# Patient Record
Sex: MALE | Race: OTHER | HISPANIC OR LATINO | Employment: UNEMPLOYED | URBAN - METROPOLITAN AREA
[De-identification: names, ages, dates, MRNs, and addresses within clinical notes are randomized per-mention and may not be internally consistent; named-entity substitution may affect disease eponyms.]

---

## 2017-02-24 ENCOUNTER — HOSPITAL ENCOUNTER (EMERGENCY)
Facility: HOSPITAL | Age: 62
Discharge: HOME | End: 2017-02-24
Admitting: EMERGENCY MEDICINE
Payer: COMMERCIAL

## 2017-02-24 VITALS
SYSTOLIC BLOOD PRESSURE: 137 MMHG | HEART RATE: 61 BPM | RESPIRATION RATE: 18 BRPM | OXYGEN SATURATION: 97 % | DIASTOLIC BLOOD PRESSURE: 84 MMHG | WEIGHT: 170.42 LBS | TEMPERATURE: 98 F

## 2017-02-24 DIAGNOSIS — M79.671 BILATERAL FOOT PAIN: Primary | ICD-10-CM

## 2017-02-24 DIAGNOSIS — M79.672 BILATERAL FOOT PAIN: Primary | ICD-10-CM

## 2017-02-24 PROCEDURE — 99283 EMERGENCY DEPT VISIT LOW MDM: CPT

## 2017-02-24 RX ORDER — GABAPENTIN 300 MG/1
300 CAPSULE ORAL 3 TIMES DAILY
Qty: 42 CAPSULE | Refills: 0 | Status: SHIPPED | OUTPATIENT
Start: 2017-02-24 | End: 2017-12-28 | Stop reason: ALTCHOICE

## 2017-02-24 RX ORDER — ATORVASTATIN CALCIUM 40 MG/1
TABLET, FILM COATED ORAL
COMMUNITY
Start: 2016-01-08 | End: 2017-12-28 | Stop reason: ALTCHOICE

## 2017-05-23 ENCOUNTER — ALLSCRIPTS OFFICE VISIT (OUTPATIENT)
Dept: OTHER | Facility: OTHER | Age: 62
End: 2017-05-23

## 2017-06-12 ENCOUNTER — OFFICE VISIT (OUTPATIENT)
Dept: URGENT CARE | Facility: MEDICAL CENTER | Age: 62
End: 2017-06-12
Payer: COMMERCIAL

## 2017-06-12 PROCEDURE — G0382 LEV 3 HOSP TYPE B ED VISIT: HCPCS

## 2017-06-12 PROCEDURE — 99283 EMERGENCY DEPT VISIT LOW MDM: CPT

## 2017-06-14 ENCOUNTER — HOSPITAL ENCOUNTER (EMERGENCY)
Facility: HOSPITAL | Age: 62
Discharge: HOME | End: 2017-06-14
Payer: COMMERCIAL

## 2017-06-14 VITALS
DIASTOLIC BLOOD PRESSURE: 81 MMHG | TEMPERATURE: 98.7 F | SYSTOLIC BLOOD PRESSURE: 121 MMHG | WEIGHT: 157 LBS | RESPIRATION RATE: 18 BRPM | OXYGEN SATURATION: 99 % | HEART RATE: 82 BPM

## 2017-06-14 DIAGNOSIS — L02.212 ABSCESS OF BACK: Primary | ICD-10-CM

## 2017-06-14 PROCEDURE — 99282 EMERGENCY DEPT VISIT SF MDM: CPT

## 2017-06-14 RX ORDER — CLINDAMYCIN HYDROCHLORIDE 300 MG/1
300 CAPSULE ORAL 3 TIMES DAILY
Qty: 30 CAPSULE | Refills: 0 | Status: SHIPPED | OUTPATIENT
Start: 2017-06-14 | End: 2017-06-24

## 2017-12-28 ENCOUNTER — APPOINTMENT (EMERGENCY)
Dept: CT IMAGING | Facility: HOSPITAL | Age: 62
End: 2017-12-28
Payer: COMMERCIAL

## 2017-12-28 ENCOUNTER — HOSPITAL ENCOUNTER (EMERGENCY)
Facility: HOSPITAL | Age: 62
Discharge: HOME | End: 2017-12-28
Attending: EMERGENCY MEDICINE | Admitting: EMERGENCY MEDICINE
Payer: COMMERCIAL

## 2017-12-28 VITALS
SYSTOLIC BLOOD PRESSURE: 200 MMHG | HEART RATE: 78 BPM | TEMPERATURE: 98.4 F | RESPIRATION RATE: 14 BRPM | WEIGHT: 145.5 LBS | DIASTOLIC BLOOD PRESSURE: 106 MMHG | OXYGEN SATURATION: 99 %

## 2017-12-28 DIAGNOSIS — A08.4 VIRAL GASTROENTERITIS: Primary | ICD-10-CM

## 2017-12-28 LAB
ALBUMIN SERPL BCP-MCNC: 3.3 G/DL (ref 3.5–5)
ALP SERPL-CCNC: 76 U/L (ref 46–116)
ALT SERPL W P-5'-P-CCNC: 22 U/L (ref 12–78)
ANION GAP SERPL CALCULATED.3IONS-SCNC: 7 MMOL/L (ref 4–13)
ANISOCYTOSIS BLD QL SMEAR: PRESENT
AST SERPL W P-5'-P-CCNC: 25 U/L (ref 5–45)
ATRIAL RATE: 76 BPM
BACTERIA UR QL AUTO: ABNORMAL /HPF
BASOPHILS # BLD MANUAL: 0 THOUSAND/UL (ref 0–0.1)
BASOPHILS NFR MAR MANUAL: 0 % (ref 0–1)
BILIRUB SERPL-MCNC: 0.57 MG/DL (ref 0.2–1)
BILIRUB UR QL STRIP: NEGATIVE
BUN SERPL-MCNC: 15 MG/DL (ref 5–25)
CALCIUM SERPL-MCNC: 9.5 MG/DL (ref 8.3–10.1)
CHLORIDE SERPL-SCNC: 100 MMOL/L (ref 100–108)
CLARITY UR: CLEAR
CO2 SERPL-SCNC: 30 MMOL/L (ref 21–32)
COLOR UR: YELLOW
CREAT SERPL-MCNC: 0.94 MG/DL (ref 0.6–1.3)
EOSINOPHIL # BLD MANUAL: 0.2 THOUSAND/UL (ref 0–0.4)
EOSINOPHIL NFR BLD MANUAL: 2 % (ref 0–6)
ERYTHROCYTE [DISTWIDTH] IN BLOOD BY AUTOMATED COUNT: 15.8 % (ref 11.6–15.1)
GFR SERPL CREATININE-BSD FRML MDRD: 85 ML/MIN/1.73SQ M
GLUCOSE SERPL-MCNC: 115 MG/DL (ref 65–140)
GLUCOSE UR STRIP-MCNC: NEGATIVE MG/DL
HCT VFR BLD AUTO: 44.4 % (ref 36.5–49.3)
HGB BLD-MCNC: 14.5 G/DL (ref 12–17)
HGB UR QL STRIP.AUTO: ABNORMAL
KETONES UR STRIP-MCNC: NEGATIVE MG/DL
LACTATE SERPL-SCNC: 0.9 MMOL/L (ref 0.5–2)
LEUKOCYTE ESTERASE UR QL STRIP: NEGATIVE
LIPASE SERPL-CCNC: 103 U/L (ref 73–393)
LYMPHOCYTES # BLD AUTO: 0.8 THOUSAND/UL (ref 0.6–4.47)
LYMPHOCYTES # BLD AUTO: 8 % (ref 14–44)
MCH RBC QN AUTO: 31 PG (ref 26.8–34.3)
MCHC RBC AUTO-ENTMCNC: 32.7 G/DL (ref 31.4–37.4)
MCV RBC AUTO: 95 FL (ref 82–98)
MONOCYTES # BLD AUTO: 0.3 THOUSAND/UL (ref 0–1.22)
MONOCYTES NFR BLD: 3 % (ref 4–12)
NEUTROPHILS # BLD MANUAL: 8.65 THOUSAND/UL (ref 1.85–7.62)
NEUTS SEG NFR BLD AUTO: 87 % (ref 43–75)
NITRITE UR QL STRIP: NEGATIVE
NON-SQ EPI CELLS URNS QL MICRO: ABNORMAL /HPF
NRBC BLD AUTO-RTO: 0 /100 WBCS
P AXIS: -10 DEGREES
PH UR STRIP.AUTO: 8.5 [PH] (ref 4.5–8)
PLATELET # BLD AUTO: 252 THOUSANDS/UL (ref 149–390)
PLATELET BLD QL SMEAR: ADEQUATE
PMV BLD AUTO: 9.7 FL (ref 8.9–12.7)
POTASSIUM SERPL-SCNC: 4.1 MMOL/L (ref 3.5–5.3)
PR INTERVAL: 152 MS
PROT SERPL-MCNC: 8.6 G/DL (ref 6.4–8.2)
PROT UR STRIP-MCNC: NEGATIVE MG/DL
QRS AXIS: -14 DEGREES
QRSD INTERVAL: 98 MS
QT INTERVAL: 384 MS
QTC INTERVAL: 432 MS
RBC # BLD AUTO: 4.68 MILLION/UL (ref 3.88–5.62)
RBC #/AREA URNS AUTO: ABNORMAL /HPF
SODIUM SERPL-SCNC: 137 MMOL/L (ref 136–145)
SP GR UR STRIP.AUTO: 1.02 (ref 1–1.03)
SPECIMEN SOURCE: NORMAL
T WAVE AXIS: 4 DEGREES
TOTAL CELLS COUNTED SPEC: 100
TROPONIN I BLD-MCNC: 0 NG/ML (ref 0–0.08)
UROBILINOGEN UR QL STRIP.AUTO: 0.2 E.U./DL
VENTRICULAR RATE: 76 BPM
WBC # BLD AUTO: 9.94 THOUSAND/UL (ref 4.31–10.16)
WBC #/AREA URNS AUTO: ABNORMAL /HPF

## 2017-12-28 PROCEDURE — 93005 ELECTROCARDIOGRAM TRACING: CPT

## 2017-12-28 PROCEDURE — 96374 THER/PROPH/DIAG INJ IV PUSH: CPT

## 2017-12-28 PROCEDURE — 99284 EMERGENCY DEPT VISIT MOD MDM: CPT

## 2017-12-28 PROCEDURE — 85007 BL SMEAR W/DIFF WBC COUNT: CPT | Performed by: EMERGENCY MEDICINE

## 2017-12-28 PROCEDURE — 85027 COMPLETE CBC AUTOMATED: CPT | Performed by: EMERGENCY MEDICINE

## 2017-12-28 PROCEDURE — 96361 HYDRATE IV INFUSION ADD-ON: CPT

## 2017-12-28 PROCEDURE — 83690 ASSAY OF LIPASE: CPT | Performed by: EMERGENCY MEDICINE

## 2017-12-28 PROCEDURE — 80053 COMPREHEN METABOLIC PANEL: CPT | Performed by: EMERGENCY MEDICINE

## 2017-12-28 PROCEDURE — 36415 COLL VENOUS BLD VENIPUNCTURE: CPT | Performed by: EMERGENCY MEDICINE

## 2017-12-28 PROCEDURE — 83605 ASSAY OF LACTIC ACID: CPT | Performed by: EMERGENCY MEDICINE

## 2017-12-28 PROCEDURE — 74177 CT ABD & PELVIS W/CONTRAST: CPT

## 2017-12-28 PROCEDURE — 84484 ASSAY OF TROPONIN QUANT: CPT

## 2017-12-28 PROCEDURE — 96375 TX/PRO/DX INJ NEW DRUG ADDON: CPT

## 2017-12-28 PROCEDURE — 81002 URINALYSIS NONAUTO W/O SCOPE: CPT | Performed by: EMERGENCY MEDICINE

## 2017-12-28 PROCEDURE — 81001 URINALYSIS AUTO W/SCOPE: CPT

## 2017-12-28 PROCEDURE — 93005 ELECTROCARDIOGRAM TRACING: CPT | Performed by: EMERGENCY MEDICINE

## 2017-12-28 RX ORDER — ONDANSETRON 2 MG/ML
4 INJECTION INTRAMUSCULAR; INTRAVENOUS ONCE
Status: COMPLETED | OUTPATIENT
Start: 2017-12-28 | End: 2017-12-28

## 2017-12-28 RX ORDER — ONDANSETRON 4 MG/1
4 TABLET, FILM COATED ORAL EVERY 6 HOURS
Qty: 12 TABLET | Refills: 0 | Status: SHIPPED | OUTPATIENT
Start: 2017-12-28 | End: 2018-05-17

## 2017-12-28 RX ORDER — MORPHINE SULFATE 2 MG/ML
2 INJECTION, SOLUTION INTRAMUSCULAR; INTRAVENOUS ONCE
Status: COMPLETED | OUTPATIENT
Start: 2017-12-28 | End: 2017-12-28

## 2017-12-28 RX ADMIN — SODIUM CHLORIDE 1000 ML: 0.9 INJECTION, SOLUTION INTRAVENOUS at 08:11

## 2017-12-28 RX ADMIN — IOHEXOL 100 ML: 350 INJECTION, SOLUTION INTRAVENOUS at 09:31

## 2017-12-28 RX ADMIN — ONDANSETRON 4 MG: 2 INJECTION INTRAMUSCULAR; INTRAVENOUS at 08:13

## 2017-12-28 RX ADMIN — MORPHINE SULFATE 2 MG: 2 INJECTION, SOLUTION INTRAMUSCULAR; INTRAVENOUS at 08:14

## 2017-12-28 NOTE — ED PROVIDER NOTES
History  Chief Complaint   Patient presents with    Abdominal Pain     vomiting,  bilateral knee pain, Denies injury  Started last night  C/o abd  Pain and vomiting since last night  No fevers  No previous abd  Surgery  Last bm today and it was normal     Pt  Complained to the triage nurse about b/l knee pain when walking, no injury  He says they don't hurt now when he is lying down  Prior to Admission Medications   Prescriptions Last Dose Informant Patient Reported? Taking?   lisinopril (ZESTRIL) 20 mg tablet Past Month at Unknown time  Yes Yes   Sig: Take 20 mg by mouth daily      Facility-Administered Medications: None       Past Medical History:   Diagnosis Date    Hyperlipidemia     Hypertension     Peripheral arterial disease ([facility] [address] )        Past Surgical History:   Procedure Laterality Date    HERNIA REPAIR      R lower quad    SHOULDER SURGERY         History reviewed  No pertinent family history  I have reviewed and agree with the history as documented  Social History   Substance Use Topics    Smoking status: Current Every Day Smoker    Smokeless tobacco: Never Used    Alcohol use No        Review of Systems   Constitutional: Positive for appetite change  Negative for fatigue and fever  HENT: Negative for rhinorrhea and sore throat  Respiratory: Negative for cough, shortness of breath and wheezing  Cardiovascular: Negative for chest pain and leg swelling  Gastrointestinal: Positive for abdominal pain, nausea and vomiting  Negative for diarrhea  Genitourinary: Negative for dysuria and flank pain  Musculoskeletal: Negative for back pain and neck pain  Skin: Negative for rash  Neurological: Negative for syncope and headaches     Psychiatric/Behavioral:        Mood normal       Physical Exam  ED Triage Vitals [12/28/17 0732]   Temperature Pulse Respirations Blood Pressure SpO2   98 4 °F (36 9 °C) 80 16 (!) 168/109 98 %      Temp Source Heart Rate Source Patient Position - Orthostatic VS BP Location FiO2 (%)   Oral Monitor Sitting Right arm --      Pain Score       5           Orthostatic Vital Signs  Vitals:    12/28/17 0930 12/28/17 0937 12/28/17 1030 12/28/17 1100   BP:  (!) 200/106     Pulse: 80 80 80 78   Patient Position - Orthostatic VS:  Lying         Physical Exam   Constitutional: He is oriented to person, place, and time  He appears well-developed and well-nourished  HENT:   Head: Normocephalic and atraumatic  Neck: Normal range of motion  Neck supple  Cardiovascular: Normal rate and regular rhythm  Pulmonary/Chest: Effort normal and breath sounds normal    Abdominal: Soft  There is no tenderness  Musculoskeletal: Normal range of motion  Mild b/l knee tenderness, +n/v intact   Neurological: He is alert and oriented to person, place, and time  Skin: Skin is warm and dry  Nursing note and vitals reviewed        ED Medications  Medications   sodium chloride 0 9 % bolus 1,000 mL (0 mL Intravenous Stopped 12/28/17 0926)   morphine injection 2 mg (2 mg Intravenous Given 12/28/17 0814)   ondansetron (ZOFRAN) injection 4 mg (4 mg Intravenous Given 12/28/17 0813)   iohexol (OMNIPAQUE) 350 MG/ML injection (MULTI-DOSE) 100 mL (100 mL Intravenous Given 12/28/17 0931)       Diagnostic Studies  Results Reviewed     Procedure Component Value Units Date/Time    Urine Microscopic [[id number]]  (Abnormal) Collected:  12/28/17 0957    Lab Status:  Final result Specimen:  Urine from Urine, Clean Catch Updated:  12/28/17 0956     RBC, UA 1-2 (A) /hpf      WBC, UA None Seen /hpf      Epithelial Cells None Seen /hpf      Bacteria, UA None Seen /hpf     POCT urinalysis dipstick [[id number]]  (Abnormal) Resulted:  12/28/17 0941    Lab Status:  Final result Specimen:  Urine Updated:  12/28/17 0941    ED Urine Macroscopic [[id number]]  (Abnormal) Collected:  12/28/17 0957    Lab Status:  Final result Specimen:  Urine Updated:  12/28/17 0939     Color, UA Yellow     Clarity, UA Clear     pH, UA 8 5 (H)     Leukocytes, UA Negative     Nitrite, UA Negative     Protein, UA Negative mg/dl      Glucose, UA Negative mg/dl      Ketones, UA Negative mg/dl      Urobilinogen, UA 0 2 E U /dl      Bilirubin, UA Negative     Blood, UA Trace (A)     Specific [place], UA 1 020    Narrative:       CLINITEK RESULT    CBC and differential [[id number]]  (Abnormal) Collected:  12/28/17 0815    Lab Status:  Final result Specimen:  Blood from Arm, Left Updated:  12/28/17 0858     WBC 9 94 Thousand/uL      RBC 4 68 Million/uL      Hemoglobin 14 5 g/dL      Hematocrit 44 4 %      MCV 95 fL      MCH 31 0 pg      MCHC 32 7 g/dL      RDW 15 8 (H) %      MPV 9 7 fL      Platelets 218 Thousands/uL      nRBC 0 /100 WBCs     Narrative: This is an appended report  These results have been appended to a previously verified report  Comprehensive metabolic panel [[id number]]  (Abnormal) Collected:  12/28/17 0815    Lab Status:  Final result Specimen:  Blood from Arm, Left Updated:  12/28/17 0853     Sodium 137 mmol/L      Potassium 4 1 mmol/L      Chloride 100 mmol/L      CO2 30 mmol/L      Anion Gap 7 mmol/L      BUN 15 mg/dL      Creatinine 0 94 mg/dL      Glucose 115 mg/dL      Calcium 9 5 mg/dL      AST 25 U/L      ALT 22 U/L      Alkaline Phosphatase 76 U/L      Total Protein 8 6 (H) g/dL      Albumin 3 3 (L) g/dL      Total Bilirubin 0 57 mg/dL      eGFR 85 ml/min/1 73sq m     Narrative:         National Kidney Disease Education Program recommendations are as follows:  GFR calculation is accurate only with a steady state creatinine  Chronic Kidney disease less than 60 ml/min/1 73 sq  meters  Kidney failure less than 15 ml/min/1 73 sq  meters      Lipase [[id number]]  (Normal) Collected:  12/28/17 0815    Lab Status:  Final result Specimen:  Blood from Arm, Left Updated:  12/28/17 0853     Lipase 103 u/L     Lactic acid, plasma [[id number]]  (Normal) Collected:  12/28/17 0815    Lab Status:  Final result Specimen: Blood from Arm, Left Updated:  12/28/17 0842     LACTIC ACID 0 9 mmol/L     Narrative:         Result may be elevated if tourniquet was used during collection  POCT troponin [[id number]]  (Normal) Collected:  12/28/17 0823    Lab Status:  Final result Updated:  12/28/17 0837     POC Troponin I 0 00 ng/ml      Specimen Type VENOUS    Narrative:         [employer]ott i-Stat handheld analyzer 99% cutoff is > 0 08ng/mL in [facility]    o cTnI 99% cutoff is useful only when applied to patients in the clinical setting of myocardial ischemia  o cTnI 99% cutoff should be interpreted in the context of clinical history, ECG findings and possibly cardiac imaging to establish correct diagnosis  o cTnI 99% cutoff may be suggestive but clearly not indicative of a coronary event without the clinical setting of myocardial ischemia  CT abdomen pelvis with contrast   Final Result by Yenni Mcconnell MD (12/28 1001)   1  Mildly distended fluid-filled loops of small bowel with some interloop fluid suggesting nonspecific infectious or inflammatory enteritis, uncomplicated  No karthik obstruction, free air or abscess formation  2   Enlarged fatty liver with recanalization of the paraumbilical vein suggesting an element of portal hypertension  The portal vein itself appears patent  3   Mild prostatomegaly           Workstation performed: [id number]                    Procedures  ECG 12 Lead Documentation  Date/Time: 12/28/2017 8:05 AM  Performed by: CECILY Mackenzie  Authorized by: CECILY Mackenzie     Rate:     ECG rate:  76    ECG rate assessment: normal    Rhythm:     Rhythm: sinus rhythm    Comments:      No st elevation or depression           Phone Contacts  ED Phone Contact    ED Course  ED Course                                MDM  Number of Diagnoses or Management Options  Viral gastroenteritis:      Amount and/or Complexity of Data Reviewed  Clinical lab tests: ordered and reviewed  Tests in the radiology section of CPT®: ordered and reviewed    Risk of Complications, Morbidity, and/or Mortality  Presenting problems: moderate  General comments: Pt  [place] better in ER and stable for discharge      CritCare Time    Disposition  Final diagnoses:   Viral gastroenteritis     Time reflects when diagnosis was documented in both MDM as applicable and the Disposition within this note     Time User Action Codes Description Comment    12/28/2017 10:43 AM Annalisa Xavier [A08 4] Viral gastroenteritis       ED Disposition     ED Disposition Condition Comment    Discharge  Bailey Amandamikey discharge to home/self care  Condition at discharge: Stable        Follow-up Information     Follow up With Specialties Details Why [facility],  [address]  [address] [address]  [address] [place] [address]          Discharge Medication List as of 12/28/2017 10:44 AM      START taking these medications    Details   ondansetron (ZOFRAN) 4 mg tablet Take 1 tablet by mouth every 6 (six) hours, Starting Thu 12/28/2017, Print         CONTINUE these medications which have NOT CHANGED    Details   lisinopril (ZESTRIL) 20 mg tablet Take 20 mg by mouth daily, Until Discontinued, Historical Med           No discharge procedures on file      ED Provider  Electronically Signed by           Uriah Hill MD  01/02/18 3276

## 2017-12-28 NOTE — DISCHARGE INSTRUCTIONS
Gastroenteritis, cuidados ambulatorios   INFORMACIÓN GENERAL:   La gastroenteritis , o gripe estomacal, es Jeffrey Lucy infección del estómago y de los intestinos  La gastroenteritis es causada por bacteria, parásitos o virus  Los siguientes son los síntomas más comunes:   · Diarrea o gases    · Náuseas, vómito, o falta de apetito    · Dolor, retorcijones o gorgoteo en el abdomen    · [place]    · Cansancio o debilidad    · Dolor de dalton, músculos adoloridos o tiene cualquiera de los síntomas arriba mencionados  Busque atención inmediata al presentar los siguientes síntomas:   · Neil en quijano diarrea    · No puede dejar de vomitar    · No ha orinado por 12 horas    · Piernas o brazos están azules    · Dificultad para respirar o tiene un pulso muy rápido  · Desvanecimientos o [employer]V  El tratamiento para la gastroenteritis  puede incluir un medicamento para detener quijano diarrea y el vómito  También puede necesitar medicamento para tratar andres infección causada por bacteria o parásitos  Controlando bernie síntomas:   · Consuma líquidos según le indicaron  Pregunte qué cantidad de líquido debe margo al día y cuáles líquidos le recomiendan  Es posible que tenga que consumir más líquidos de lo habitual para evitar la deshidratación  Con frecuencia chupe hielo o tome unos sorbos de líquido, si no puede mantener nada en el estómago  · Heber andres solución de sales de rehidratación oral (SRO o shawnee oral)  La solución de rehidratación contiene la combinación adecuada de [place], sales y azúcar para [employer] líquidos que se colin perdido  Pregunte que clase de solución oral debe usar y la cantidad que debe margo  · Consuma andres Rosibel [place]  Cuando tenga hambre, empiece con alimentos ligeros y blandos  Unos buenos ejemplos son [facility] con galletas saladas, arroz, compota de Corpus wendi, claudia y té   No consuma productos lácteos, ni bebidas alcohólicas, ni azucaradas, ni con cafeína, hasta que se sienta mejor   Prevenir la propagación de gérmenes:   · Lave bernie harmony con frecuencia  Use agua y [place]  [employer] las harmony después de usar el baño, cambiar pañales o estornudar  Lávese las harmony antes de preparar o comer alimentos  · Limpie las superficies y lave la ropa con frecuencia  Lave la ropa y las toallas aparte del jeramy de las prendas para [address]  Limpie las superficies en cortez hogar con un desinfectante o blanqueador (Scooby Ganser)  · Limpie y cocine los alimentos completamente  Lave je las verduras antes que las Tom Hones a cocinar  Cocine je y del todo la carne, el pescado y Tony  Siempre coloque los alimentos cocinados en un plato limpio  Nunca use un plato que haya usado para la carne [place]  Guarde de inmediato en el refrigerador cualquier [employer]  · Tenga cuidado cuando vaya a campar o de viaje  Sólo tome agua potable  No tome agua de los cooper, ni de [address], a menos que usted haya filtrado o hervido el agua beryl  Al salir de viaje, consuma agua de botella y evite el hielo  No consuma fruta que no haya sido pelada  Evite el pescado crudo o la carne que no esté je cocida  Programe andres regi con cortez proveedor de [employer] se le haya indicado: Anote bernie preguntas para que se acuerde de hacerlas lyn bernie visitas  ACUERDOS SOBRE CORTEZ CUIDADO:   Usted tiene el derecho de participar en la planificación de cortez cuidado  Aprenda todo lo que pueda sobre cortez condición y irish darle tratamiento  Discuta con bernie médicos bernie opciones de tratamiento para juntos decidir el cuidado que usted quiere recibir  Usted siempre tiene el derecho a rechazar cortez tratamiento  Esta información es sólo para uso en educación  Cortez intención no es darle un consejo médico sobre enfermedades o tratamientos  Colsulte con cortez Ladena Creed farmacéutico antes de seguir cualquier régimen médico para saber si es seguro y efectivo para usted    © 2014 [address] is for End User's use only and may not be sold, redistributed or otherwise used for commercial purposes  All illustrations and images included in CareNotes® are the copyrighted property of A D A M , Inc  or Diego Davis

## 2018-01-11 NOTE — MISCELLANEOUS
Provider Comments  Provider Comments:   Pt was a no-show for today's 340pm apt   Called and spoke with the daughter who will have the Pt call us to r/s      Signatures   Electronically signed by : Cindy Correa, ; May 23 2017  4:21PM EST                       (Administrative)

## 2018-01-15 NOTE — MISCELLANEOUS
Provider Comments  Provider Comments:   Patient is a no-show for his [phone] appointment today        Signatures   Electronically signed by : Derek Graves PAC; Jan 25 2016  9:37AM EST                       (Author)    Electronically signed by : XAVI Abdalla ; Jan 25 2016 11:59AM EST

## 2018-01-17 NOTE — MISCELLANEOUS
Provider Comments  Provider Comments:   Patient was a no show to today appointment at 1000        Signatures   Electronically signed by : MEAGAN Mcallister; Jul 15 2016 12:11PM EST                       (Author)    Electronically signed by : XAVI Portillo ; Jul 15 2016  2:15PM EST

## 2018-05-17 ENCOUNTER — HOSPITAL ENCOUNTER (EMERGENCY)
Facility: HOSPITAL | Age: 63
Discharge: HOME | End: 2018-05-17
Payer: MEDICARE

## 2018-05-17 VITALS
TEMPERATURE: 98.5 F | HEART RATE: 79 BPM | DIASTOLIC BLOOD PRESSURE: 101 MMHG | BODY MASS INDEX: 28.72 KG/M2 | SYSTOLIC BLOOD PRESSURE: 157 MMHG | RESPIRATION RATE: 16 BRPM | OXYGEN SATURATION: 100 % | WEIGHT: 157 LBS

## 2018-05-17 DIAGNOSIS — J34.0 ABSCESS OF EXTERNAL NOSE: Primary | ICD-10-CM

## 2018-05-17 DIAGNOSIS — J34.0 CELLULITIS OF NOSE: ICD-10-CM

## 2018-05-17 PROCEDURE — 99282 EMERGENCY DEPT VISIT SF MDM: CPT

## 2018-05-17 RX ORDER — ATORVASTATIN CALCIUM 40 MG/1
40 TABLET, FILM COATED ORAL DAILY
COMMUNITY

## 2018-05-17 RX ORDER — IBUPROFEN 400 MG/1
400 TABLET ORAL ONCE
Status: COMPLETED | OUTPATIENT
Start: 2018-05-17 | End: 2018-05-17

## 2018-05-17 RX ORDER — CEPHALEXIN 500 MG/1
500 CAPSULE ORAL 4 TIMES DAILY
Qty: 28 CAPSULE | Refills: 0 | Status: SHIPPED | OUTPATIENT
Start: 2018-05-17 | End: 2018-05-24

## 2018-05-17 RX ADMIN — IBUPROFEN 400 MG: 400 TABLET, FILM COATED ORAL at 12:15

## 2018-05-17 RX ADMIN — LIDOCAINE HYDROCHLORIDE 10 ML: 20 SOLUTION ORAL; TOPICAL at 10:57

## 2018-05-17 NOTE — ED PROVIDER NOTES
History  Chief Complaint   Patient presents with    Abscess     Pt noticed abscess on his nose when he woke up  Denies trouble breathing  68-year-old male presents for evaluation of an abscess over the nose for the past day  Patient reports he noticed some pus discharge as well as redness across frontal aspect of his nose  States that he has not done anything for this  Reports that the pain is them increasing  States that he did not notice any discharge or pus or blood  States he has pain all over the septal area of his nose  Denies any nasal bleeding  Denies fever, chills, nausea vomiting, difficulty breathing or shortness of breath  Prior to Admission Medications   Prescriptions Last Dose Informant Patient Reported? Taking?   atorvastatin (LIPITOR) 40 mg tablet   Yes Yes   Sig: Take 40 mg by mouth daily   lisinopril (ZESTRIL) 20 mg tablet   Yes Yes   Sig: Take 20 mg by mouth daily      Facility-Administered Medications: None       Past Medical History:   Diagnosis Date    Hyperlipidemia     Hypertension     Peripheral arterial disease ([facility] [address] )        Past Surgical History:   Procedure Laterality Date    HERNIA REPAIR      R lower quad    SHOULDER SURGERY         History reviewed  No pertinent family history  I have reviewed and agree with the history as documented  Social History   Substance Use Topics    Smoking status: Current Every Day Smoker    Smokeless tobacco: Never Used    Alcohol use No        Review of Systems   Constitutional: Negative for chills and fever  HENT: Negative for congestion  Gastrointestinal: Negative for nausea and vomiting  Skin: Positive for color change          Abscess over nose       Physical Exam  ED Triage Vitals [05/17/18 0931]   Temperature Pulse Respirations Blood Pressure SpO2   98 5 °F (36 9 °C) 79 16 (!) 157/101 100 %      Temp Source Heart Rate Source Patient Position - Orthostatic VS BP Location FiO2 (%)   Temporal Monitor Sitting Right arm --      Pain Score       2           Orthostatic Vital Signs  Vitals:    05/17/18 0931   BP: (!) 157/101   Pulse: 79   Patient Position - Orthostatic VS: Sitting       Physical Exam   Constitutional: He is oriented to person, place, and time  He appears well-developed and well-nourished  No distress  HENT:   Head: Normocephalic and atraumatic  Nose: No rhinorrhea, sinus tenderness, nasal deformity, septal deviation or nasal septal hematoma  No epistaxis  Two approximately   5 cm abscesses noted surrounded by cellulitis  Pulmonary/Chest: Effort normal and breath sounds normal    Neurological: He is alert and oriented to person, place, and time  Skin: Skin is warm  He is not diaphoretic  There is erythema  Vitals reviewed  ED Medications  Medications   lidocaine viscous (XYLOCAINE) 2 % mucosal solution 10 mL (10 mL Swish & Spit Given 5/17/18 1057)   ibuprofen (MOTRIN) tablet 400 mg (400 mg Oral Given 5/17/18 1215)       Diagnostic Studies  Results Reviewed     None                 No orders to display              Procedures  Incision/Drainage  Date/Time: 5/17/2018 10:00 AM  Performed by: Fabienne Galvan  Authorized by: Fabienne Galvan     Patient location:  ED  Other Assisting Provider: No    Consent:     Consent obtained:  Verbal    Consent given by:  Patient    Risks discussed:  Bleeding, incomplete drainage, infection and pain  Universal protocol:     Patient identity confirmed:  Verbally with patient  Location:     Type:  Abscess    Location:  Head/neck    Head/neck location:  Nose  Anesthesia (see MAR for exact dosages): Anesthesia method:  Topical application    Topical anesthetic:  Lidocaine gel  Procedure details:     Complexity:  Simple    Incision types:   Other (comment) (18G needle)    Approach:  Open    Incision depth:  Skin    Drainage:  Bloody and purulent    Drainage amount:  Scant    Wound treatment:  Wound left open    Packing materials:  None  Post-procedure details: Patient tolerance of procedure: Tolerated well, no immediate complications           Phone Contacts  ED Phone Contact    ED Course                               MDM  CritCare Time    Disposition  Final diagnoses:   Abscess of external nose   Cellulitis of nose     Time reflects when diagnosis was documented in both MDM as applicable and the Disposition within this note     Time User Action Codes Description Comment    5/17/2018 12:05 PM Benjamin Castro [J34 0] Abscess of external nose     5/17/2018 12:06 PM Benjamin Ballard Add [J34 0] Cellulitis of nose       ED Disposition     ED Disposition Condition Comment    Discharge  Curtis Held discharge to home/self care  Condition at discharge: Stable        Follow-up Information     Follow up With Specialties Details Why Contact Info Additional Information    Estevan Dyer MD Family Medicine In 1 day For wound re-check [facility]  [address] [facility]  [address]  [address] [facility] In 2 days For wound re-check in 1 - 2 days  [address] [facility]  [phone] Via the [address] [facility] ([facility] Take [address] toward [facility]  Take the [facility] [address]  Keep right and follow signs for [address]/ [place]  Merge onto [address] [facility]  In a half mile, take the exit for [address] toward [facility]  In 0 7 miles take the [facility] [employer] [address]  Merge onto [facility]  In 500 feet, turn left on [address] and drive 0 3 miles  [address] will be on your left  [address] toward [place]  Take the [facility] [employer]  Merge onto [facility]  In 500 feet, turn left on [address] and drive 0 3 miles  [address] will be on your left  [address] to [address] towards [facility]   In 0 7 miles take the [address] [place] [address] Exit  [place] [address] onto [address]  In 500 feet, turn left on [address] and drive 0 3 miles  [address] will be on your left  Discharge Medication List as of 5/17/2018 12:07 PM      START taking these medications    Details   cephalexin (KEFLEX) 500 mg capsule Take 1 capsule (500 mg total) by mouth 4 (four) times a day for 7 days, Starting u 5/17/2018, Until Thu 5/24/2018, Print      mupirocin (BACTROBAN) 2 % nasal ointment into each nostril 2 (two) times a day for 5 days, Starting Thu 5/17/2018, Until Tue 5/22/2018, Print         CONTINUE these medications which have NOT CHANGED    Details   atorvastatin (LIPITOR) 40 mg tablet Take 40 mg by mouth daily, Historical Med      lisinopril (ZESTRIL) 20 mg tablet Take 20 mg by mouth daily, Until Discontinued, Historical Med           No discharge procedures on file      ED Provider  Electronically Signed by           Sean Ayon PA-C  05/24/18 7441

## 2018-05-17 NOTE — DISCHARGE INSTRUCTIONS
Absceso   LO QUE NECESITA SABER:   ¿Qué es un absceso? Un absceso es un área bajo la piel donde se acumula pus (fluido infectado)  Un absceso es a menudo causado por bacterias, hongos u otros gérmenes que entran en andres herida O[place]  Usted puede tener un absceso en cualquier parte de quijano cuerpo  ¿Qué aumenta mi riesgo de tener un absceso? · Vara Keira de animal    · Un objeto extraño debajo de quijano piel    · Sudor sally o frecuente    · Un problema de nadine, irish diabetes u obesidad    · Inyección de drogas ilegales  ¿Cuáles son los signos y síntomas de un absceso? Usted podría tener un absceso inflamado, haas y doloroso  Es probable que le salga pus de mery masa  El pus será coates o amarillo y puede tener mal olor  Es probable que usted tenga enrojecimiento y dolor mary anne antes de que aparezca la masa  Si la infección se disemina, puede tener fiebre y escalofríos  ¿Cómo se diagnostica un absceso? Quijano médico le [address] comprobará si el absceso está supurando  Es probable que [place] de líquido del absceso muestre qué es lo que está causando quijano infección  ¿Cómo se trata un absceso? · Incisión y drenaje  es un procedimiento que se [place] para drenar el pus y el líquido del absceso  Quijano médico hará andres incisión en el absceso para que pueda drenar  Luego le pondrá gasa en la herida y la cubrirá con andres venda  · Cirugía  podría ser necesaria para quitar el abseso  Es probable que quijano médico le extirpe el absceso si éste está en bernie harmony o glúteos  La cirugía puede disminuir el riesgo de que el absceso se forme otra vez  ¿Qué puedo hacer para cuidarme? · Aplique andres compresa tibia en el absceso  [place] ayudará a que el absceso se dave y drene  Moje andres toallita en agua tibia jose no caliente  Aplicar la compresa lyn 10 minutos  Ramon esto 4 veces al día  No  presione el absceso ni trate de abrirlo con [place]   Puede empujar las bacterias de manera más profunda hacia la perico  · No compartir con nadie rueda ropa, toallas o sábanas  [place] puede propagar la infección a otros  · Lávese las harmony frecuentemente  [place] ayudará a prevenir la propagación de gérmenes  Use jabón y agua o un ungüento con base de alcohol  ¿Qué puedo hacer para cuidar mi herida después que fue drenada? · Siga las instrucciones de rueda médico sobre el cuidado de bernie heridas  Si rueda médico dice que [employer], retire cuidadosamente el vendaje y la gasa  Puede que necesite empapar la gasa para poder sacarla de la herida  Limpié la herida y [place] a rueda alrededor según indicaciones  Seque el área y póngase andres venda nueva y limpia  Cambie bernie vendajes cuando se mojen o ensucien  · Pregúntele a rueda médico cómo cambiarse la gasa en rueda herida  Cuente el número de apósitos de gasa que se colocan dentro de rueda herida  No ponga demasiada gasa en la herida  No aprete demasiado la herida con la gasa  ¿Cuándo osei buscar atención inmediata? · El área alrededor del absceso se pone muy dolorosa, caliente o tiene manchas garduno  · Usted tiene fiebre o escalofríos  · Rueda corazón está latiendo mas rápido de lo normal      · Se siente desmayar o confundido  ¿Cuándo osei comunicarme con mi médico?   · Rueda absceso se hace más sruthi  · El absceso se vuelve a formar  · Usted tiene preguntas o inquietudes acerca de ureda condición o cuidado  ACUERDOS SOBRE RUEDA CUIDADO:   Usted tiene el derecho de ayudar a planear rueda cuidado  Aprenda todo lo que pueda sobre rueda condición y irish darle tratamiento  Discuta bernie opciones de tratamiento con bernie médicos para decidir el cuidado que usted desea recibir  Usted siempre tiene el derecho de rechazar el tratamiento  Esta información es sólo para uso en educación  Rueda intención no es darle un consejo médico sobre enfermedades o tratamientos  Colsulte con rueda Alben Jim farmacéutico antes de seguir cualquier régimen médico para saber si es seguro y efectivo para usted    © 2017 [address]  Information is for End User's use only and may not be sold, redistributed or otherwise used for commercial purposes  All illustrations and images included in CareNotes® are the copyrighted property of A D A M , Inc  or Diego Davis  Celulihortencia   LO QUE NECESITA SABER:   ¿Qué es la celulitis? La celulitis es andres infección en la piel causada por bacteria  La celulitis generalmente aparece en las piernas y los pies, los brazos y las harmony o la amrik  ¿Qué aumenta mi riesgo de celulitis? · Un lesión que abre la piel, irish andres mordida, un rasguño o andres cortada    · Llagas o heridas expuestas a agua de sharron o de estasuncion, arroyos o sánchez    · Artículos compartidos, irish las toallas o el equipo de ejercicio    · Drogas que se inyectan    · Un sistema inmunitario débil o diabetes    · Linfedema, insuficiencia venosa crónica, enfermedad vascular periférica o trombosis venosa profunda  ¿Cuáles son los signos y síntomas de la celulitis? · Un área rojiza, caliente e inflamada en quijano piel    · Dolor al tocar el área afectada    · Protuberancias o ampollas (abscesos) que podrían drenar pus    · Piel abultada que sobresale y que se siente irish cáscara de naranja  ¿Cómo se diagnostica la celulitis? Es probable que quijano médico sepa que usted tiene celulitis sólo con mirar y sentir quijano piel  Infórmele cuánto hace que usted tiene los síntomas y si hay algo que ayuda a reducirlos  Dígale a quijano médico si en algún momento ha tenido andres infección por celulitis  Gerry vez el médico no sepa qué tipo de bacterias causa la celulitis  Es posible que usted necesite alguno de los siguientes estudios:  · Análisis de perico  que pueden mostrar cuál bacteria está causando quijano infección  Los análisis de perico también pueden mostrar si la infección está en la [place]  · Tor Garcia de tejido o líquido de quijano piel infectada  podrían mostrar la causa de quijano infección   [address] mostrar si quijano infección es causada par algún otro tipo de trastorno en la piel  · Lange Conradi, un ultrasonido, andres tomografía computarizada o andres imagen por resonancia magnética (IRM)  podrían mostrar si la infección se ha propagado  Puede que a usted [employer] un líquido de contraste para ayudar a que la infección se aprecie mejor en las imágenes  Dígale al médico si usted alguna vez ha tenido andres reacción alérgica al líquido de [place]  No entre a la sherlyn donde se realiza la resonancia magnética con algo de metal  El metal puede causar lesiones serias  Dígale al médico si usted tiene algo de metal por dentro o sobre quijano cuerpo  ¿Cómo se trata la celulitis? El tratamiento puede [employer] síntomas, detener la propagación de la infección y curarla  El tratamiento depende de la gravedad de la celulitis  La celulitis podría desaparecer por sí paola  En cambio usted podría  necesitar antibióticos para ayudar a tratar la infección bacteriana  Quijano médico puede dibujar un círculo alrededor de los bordes de quijano celulitis  Si la celulitis se extiende, quijano médico verá que se salió del círculo  ¿Cómo puedo controlar los síntomas? · Eleve el área por encima del nivel de quijano corazón   con la frecuencia posible  [place] va a disminuir inflamación y el dolor  Coloque el área sobre almohadas o sábanas para tratar de mantenerla elevada cómodamente  · Limpie la khari diariamente hasta que se forme andres costra sobre la herida  Singh [place] y agua  Séquela con palmaditas  Use apósitos irish se le haya indicado  · Coloque paños húmedos fríos o calientes en la khari irish se le haya indicado  Use paños limpios y agua limpia  Déjelos en el área hasta que el paño llegue a temperatura ambiente  Seque el área con palmaditas con un paño limpio y seco  [employer] ayudar a disminuir el dolor  ¿Cómo puedo prevenir la celulitis? · No se rasque picaduras de insectos o áreas lesionadas    Rascarse estas áreas aumenta quijano riesgo de tener celulitis  · No comparta los artículos de [address] personal, irish toallas, ropa, o navajas de afeitar  · Limpie los equipos de ejercicio  con un detergente desinfectante antes y después de [place]  · Lávese las harmony frecuentemente  Utilice agua y [place]  [employer] las harmony después de usar el baño, cambiarle el pañal a un humaira o estornudar  Lávese las harmony antes de comer o preparar alimentos  Use andres loción para evitar que la piel se reseque o se agriete  · Use medias de compresión irish se le indique  Es posible que le recomienden usar las medias si tiene edema periférico  Edema periférico es la hinchazón en las piernas  Las medias mejoran el flujo sanguíneo y [address]  · Trate el pie de atleta  [place] puede ayudar a prevenir la propagación de andres infección bacteriana en la piel  Llame al 911 si presenta:   · Tiene dolor en el pecho o dificultad repentina para respirar  ¿Cuándo osei buscar atención inmediata? · Quijano herida se engrandece o tiene más dolor  · Usted siente sonidos crepitantes bajo la piel al tocarla  · Usted tiene puntos o protuberancias color yaron en quijano piel o nota perico debajo quijano piel  · Usted tiene andres nueva inflamación o dolor en bernie piernas  · [place] [place] enrojecidas, cálidas e inflamadas se [address]  · Usted ve líneas garduno saliendo del área infectada  ¿Cuándo osei comunicarme con mi médico?   · Usted tiene fiebre  · Quijano fiebre o dolor no desaparecen o empeoran  · El área no se reduce después de 2 días de uso de antibióticos  · Usted tiene preguntas o inquietudes acerca de quijano condición o cuidado  ACUERDOS SOBRE QUIJANO CUIDADO:   Usted tiene el derecho de ayudar a planear quijano cuidado  Aprenda todo lo que pueda sobre quijano condición y irish darle tratamiento  Discuta bernie opciones de tratamiento con bernie médicos para decidir el cuidado que usted desea recibir   Usted siempre tiene el derecho de rechazar el tratamiento  Esta información es sólo para uso en educación  Quijano intención no es darle un consejo médico sobre enfermedades o tratamientos  Colsulte con quijano [place] farmacéutico antes de seguir cualquier régimen médico para saber si es seguro y efectivo para usted  © 2017 [address] [place] Information is for End User's use only and may not be sold, redistributed or otherwise used for commercial purposes  All illustrations and images included in CareNotes® are the copyrighted property of A D A M , Inc  or Diego Davis

## 2018-05-18 ENCOUNTER — APPOINTMENT (EMERGENCY)
Dept: CT IMAGING | Facility: HOSPITAL | Age: 63
End: 2018-05-18
Payer: MEDICARE

## 2018-05-18 ENCOUNTER — HOSPITAL ENCOUNTER (EMERGENCY)
Facility: HOSPITAL | Age: 63
Discharge: HOME | End: 2018-05-19
Attending: EMERGENCY MEDICINE
Payer: MEDICARE

## 2018-05-18 DIAGNOSIS — R10.9 NONSPECIFIC ABDOMINAL PAIN: ICD-10-CM

## 2018-05-18 DIAGNOSIS — K52.9 ENTERITIS: Primary | ICD-10-CM

## 2018-05-18 LAB
ALBUMIN SERPL BCP-MCNC: 3.3 G/DL (ref 3.5–5)
ALP SERPL-CCNC: 88 U/L (ref 46–116)
ALT SERPL W P-5'-P-CCNC: 23 U/L (ref 12–78)
ANION GAP SERPL CALCULATED.3IONS-SCNC: 6 MMOL/L (ref 4–13)
AST SERPL W P-5'-P-CCNC: 24 U/L (ref 5–45)
BASOPHILS # BLD AUTO: 0.02 THOUSANDS/ΜL (ref 0–0.1)
BASOPHILS NFR BLD AUTO: 0 % (ref 0–1)
BILIRUB SERPL-MCNC: 0.32 MG/DL (ref 0.2–1)
BUN SERPL-MCNC: 13 MG/DL (ref 5–25)
CALCIUM SERPL-MCNC: 9.3 MG/DL (ref 8.3–10.1)
CHLORIDE SERPL-SCNC: 103 MMOL/L (ref 100–108)
CO2 SERPL-SCNC: 31 MMOL/L (ref 21–32)
CREAT SERPL-MCNC: 0.9 MG/DL (ref 0.6–1.3)
EOSINOPHIL # BLD AUTO: 0.21 THOUSAND/ΜL (ref 0–0.61)
EOSINOPHIL NFR BLD AUTO: 3 % (ref 0–6)
ERYTHROCYTE [DISTWIDTH] IN BLOOD BY AUTOMATED COUNT: 16.8 % (ref 11.6–15.1)
GFR SERPL CREATININE-BSD FRML MDRD: 89 ML/MIN/1.73SQ M
GLUCOSE SERPL-MCNC: 106 MG/DL (ref 65–140)
HCT VFR BLD AUTO: 36.2 % (ref 36.5–49.3)
HGB BLD-MCNC: 11.7 G/DL (ref 12–17)
LACTATE SERPL-SCNC: 0.6 MMOL/L (ref 0.5–2)
LIPASE SERPL-CCNC: 109 U/L (ref 73–393)
LYMPHOCYTES # BLD AUTO: 2.01 THOUSANDS/ΜL (ref 0.6–4.47)
LYMPHOCYTES NFR BLD AUTO: 28 % (ref 14–44)
MCH RBC QN AUTO: 30 PG (ref 26.8–34.3)
MCHC RBC AUTO-ENTMCNC: 32.3 G/DL (ref 31.4–37.4)
MCV RBC AUTO: 93 FL (ref 82–98)
MONOCYTES # BLD AUTO: 0.33 THOUSAND/ΜL (ref 0.17–1.22)
MONOCYTES NFR BLD AUTO: 5 % (ref 4–12)
NEUTROPHILS # BLD AUTO: 4.61 THOUSANDS/ΜL (ref 1.85–7.62)
NEUTS SEG NFR BLD AUTO: 64 % (ref 43–75)
NRBC BLD AUTO-RTO: 0 /100 WBCS
PLATELET # BLD AUTO: 283 THOUSANDS/UL (ref 149–390)
PMV BLD AUTO: 9.9 FL (ref 8.9–12.7)
POTASSIUM SERPL-SCNC: 3.9 MMOL/L (ref 3.5–5.3)
PROT SERPL-MCNC: 8.4 G/DL (ref 6.4–8.2)
RBC # BLD AUTO: 3.9 MILLION/UL (ref 3.88–5.62)
SODIUM SERPL-SCNC: 140 MMOL/L (ref 136–145)
WBC # BLD AUTO: 7.18 THOUSAND/UL (ref 4.31–10.16)

## 2018-05-18 PROCEDURE — 96374 THER/PROPH/DIAG INJ IV PUSH: CPT

## 2018-05-18 PROCEDURE — 85025 COMPLETE CBC W/AUTO DIFF WBC: CPT | Performed by: EMERGENCY MEDICINE

## 2018-05-18 PROCEDURE — 74177 CT ABD & PELVIS W/CONTRAST: CPT

## 2018-05-18 PROCEDURE — 36415 COLL VENOUS BLD VENIPUNCTURE: CPT | Performed by: EMERGENCY MEDICINE

## 2018-05-18 PROCEDURE — 80053 COMPREHEN METABOLIC PANEL: CPT | Performed by: EMERGENCY MEDICINE

## 2018-05-18 PROCEDURE — 83690 ASSAY OF LIPASE: CPT | Performed by: EMERGENCY MEDICINE

## 2018-05-18 PROCEDURE — 83605 ASSAY OF LACTIC ACID: CPT | Performed by: EMERGENCY MEDICINE

## 2018-05-18 RX ORDER — CIPROFLOXACIN 500 MG/1
500 TABLET, FILM COATED ORAL 2 TIMES DAILY
Qty: 20 TABLET | Refills: 0 | Status: SHIPPED | OUTPATIENT
Start: 2018-05-18 | End: 2018-05-28

## 2018-05-18 RX ORDER — METRONIDAZOLE 500 MG/1
500 TABLET ORAL EVERY 8 HOURS SCHEDULED
Qty: 30 TABLET | Refills: 0 | Status: SHIPPED | OUTPATIENT
Start: 2018-05-18 | End: 2018-05-28

## 2018-05-18 RX ORDER — MORPHINE SULFATE 4 MG/ML
6 INJECTION, SOLUTION INTRAMUSCULAR; INTRAVENOUS ONCE
Status: COMPLETED | OUTPATIENT
Start: 2018-05-18 | End: 2018-05-18

## 2018-05-18 RX ADMIN — IOHEXOL 100 ML: 350 INJECTION, SOLUTION INTRAVENOUS at 23:10

## 2018-05-18 RX ADMIN — MORPHINE SULFATE 6 MG: 4 INJECTION INTRAVENOUS at 22:27

## 2018-05-19 VITALS
TEMPERATURE: 98.1 F | RESPIRATION RATE: 18 BRPM | DIASTOLIC BLOOD PRESSURE: 74 MMHG | SYSTOLIC BLOOD PRESSURE: 132 MMHG | HEART RATE: 75 BPM | OXYGEN SATURATION: 98 %

## 2018-05-19 PROCEDURE — 99284 EMERGENCY DEPT VISIT MOD MDM: CPT

## 2018-05-19 NOTE — ED NOTES
Patient remains slightly drowsy but easily arousable  Dr Pedro Conrad made aware        William Todd, PATY  05/19/18 2773

## 2018-05-19 NOTE — ED NOTES
Patient able to ambulate to bathroom without difficulty, steady gait       Haven Mclean RN  05/19/18 6900

## 2018-05-19 NOTE — ED PROVIDER NOTES
History  Chief Complaint   Patient presents with    Abdominal Pain     Pt states he has left sided abd pain that started today  Denies NVD  Denies urinary symptoms  58-year-old male with history of hypertension presents for abdominal pain  Left lower quadrant and occasionally suprapubic, sharp stabbing pain nonradiating present for the past 5-6 hours, 7 out 10 severity  No associated nausea vomiting diarrhea  Last bowel movement was 3 days ago  No history of recent abdominal surgeries but had a right hernia repair greater than 5 years ago  Denies any chest pain flank pain back pain  No dysuria hematuria or pain/swelling in the testicles  No recent trauma  No history of similar symptoms in the past   Has not tried any medications for this pain  He has no history of Crohn's, ulcerative colitis, diverticulitis  Prior to Admission Medications   Prescriptions Last Dose Informant Patient Reported? Taking?   atorvastatin (LIPITOR) 40 mg tablet   Yes No   Sig: Take 40 mg by mouth daily   cephalexin (KEFLEX) 500 mg capsule   No No   Sig: Take 1 capsule (500 mg total) by mouth 4 (four) times a day for 7 days   lisinopril (ZESTRIL) 20 mg tablet   Yes No   Sig: Take 20 mg by mouth daily   mupirocin (BACTROBAN) 2 % nasal ointment   No No   Sig: into each nostril 2 (two) times a day for 5 days      Facility-Administered Medications: None       Past Medical History:   Diagnosis Date    Hyperlipidemia     Hypertension     Peripheral arterial disease ([facility] [address] )        Past Surgical History:   Procedure Laterality Date    HERNIA REPAIR      R lower quad    SHOULDER SURGERY         History reviewed  No pertinent family history  I have reviewed and agree with the history as documented  Social History   Substance Use Topics    Smoking status: Current Every Day Smoker    Smokeless tobacco: Never Used    Alcohol use No        Review of Systems   Constitutional: Negative for chills, fatigue and fever     HENT: Negative for congestion and sore throat  Eyes: Negative for visual disturbance  Respiratory: Negative for cough, chest tightness, shortness of breath and wheezing  Cardiovascular: Negative for chest pain, palpitations and leg swelling  Gastrointestinal: Positive for abdominal pain  Negative for blood in stool, diarrhea, nausea and vomiting  Genitourinary: Negative for difficulty urinating, dysuria, flank pain, frequency, hematuria, penile pain, penile swelling, scrotal swelling, testicular pain and urgency  Musculoskeletal: Negative for gait problem  Skin: Negative for rash  Neurological: Negative for dizziness, syncope, weakness, light-headedness, numbness and headaches  Hematological: Negative for adenopathy  Psychiatric/Behavioral: Negative for sleep disturbance  Physical Exam  ED Triage Vitals   Temperature Pulse Respirations Blood Pressure SpO2   05/18/18 2154 05/18/18 2154 05/18/18 2154 05/18/18 2154 05/18/18 2154   98 1 °F (36 7 °C) 75 20 151/83 99 %      Temp Source Heart Rate Source Patient Position - Orthostatic VS BP Location FiO2 (%)   05/18/18 2154 05/18/18 2154 05/18/18 2245 05/18/18 2154 --   Temporal Monitor Lying Right arm       Pain Score       05/18/18 2154       Worst Possible Pain           Orthostatic Vital Signs  Vitals:    05/18/18 2154 05/18/18 2245 05/19/18 0050   BP: 151/83 131/76 127/72   Pulse: 75 70 69   Patient Position - Orthostatic VS:  Lying Lying       Physical Exam   Constitutional: He is oriented to person, place, and time  He appears well-developed and well-nourished  HENT:   Head: Normocephalic and atraumatic  Eyes: Conjunctivae and EOM are normal  Pupils are equal, round, and reactive to light  Neck: Normal range of motion  Neck supple  No JVD present  Cardiovascular: Normal rate and regular rhythm  No murmur heard  Pulmonary/Chest: Effort normal and breath sounds normal  He has no wheezes  He has no rales  Abdominal: Soft   Normal appearance and bowel sounds are normal  He exhibits no distension  There is no hepatosplenomegaly  There is tenderness in the suprapubic area and left lower quadrant  There is no rigidity, no rebound and no CVA tenderness  No hernia  Musculoskeletal: He exhibits no edema  Lymphadenopathy:     He has no cervical adenopathy  Neurological: He is alert and oriented to person, place, and time  Skin: Skin is warm  No rash noted  He is not diaphoretic  Psychiatric: He has a normal mood and affect  Vitals reviewed  ED Medications  Medications   morphine (PF) 4 mg/mL injection 6 mg (6 mg Intravenous Given 5/18/18 2227)   iohexol (OMNIPAQUE) 350 MG/ML injection (SINGLE-DOSE) 100 mL (100 mL Intravenous Given 5/18/18 2310)       Diagnostic Studies  Results Reviewed     Procedure Component Value Units Date/Time    Lactic acid x2 Q2H [[id number]]  (Normal) Collected:  05/18/18 2227    Lab Status:  Final result Specimen:  Blood from Arm, Left Updated:  05/18/18 2255     LACTIC ACID 0 6 mmol/L     Narrative:         Result may be elevated if tourniquet was used during collection  Comprehensive metabolic panel [[id number]]  (Abnormal) Collected:  05/18/18 2227    Lab Status:  Final result Specimen:  Blood from Arm, Left Updated:  05/18/18 2252     Sodium 140 mmol/L      Potassium 3 9 mmol/L      Chloride 103 mmol/L      CO2 31 mmol/L      Anion Gap 6 mmol/L      BUN 13 mg/dL      Creatinine 0 90 mg/dL      Glucose 106 mg/dL      Calcium 9 3 mg/dL      AST 24 U/L      ALT 23 U/L      Alkaline Phosphatase 88 U/L      Total Protein 8 4 (H) g/dL      Albumin 3 3 (L) g/dL      Total Bilirubin 0 32 mg/dL      eGFR 89 ml/min/1 73sq m     Narrative:         National Kidney Disease Education Program recommendations are as follows:  GFR calculation is accurate only with a steady state creatinine  Chronic Kidney disease less than 60 ml/min/1 73 sq  meters  Kidney failure less than 15 ml/min/1 73 sq  meters      Lipase [[id number]] (Normal) Collected:  05/18/18 2227    Lab Status:  Final result Specimen:  Blood from Arm, Left Updated:  05/18/18 2252     Lipase 109 u/L     CBC and differential [[id number]]  (Abnormal) Collected:  05/18/18 2226    Lab Status:  Final result Specimen:  Blood from Arm, Left Updated:  05/18/18 2239     WBC 7 18 Thousand/uL      RBC 3 90 Million/uL      Hemoglobin 11 7 (L) g/dL      Hematocrit 36 2 (L) %      MCV 93 fL      MCH 30 0 pg      MCHC 32 3 g/dL      RDW 16 8 (H) %      MPV 9 9 fL      Platelets 875 Thousands/uL      nRBC 0 /100 WBCs      Neutrophils Relative 64 %      Lymphocytes Relative 28 %      Monocytes Relative 5 %      Eosinophils Relative 3 %      Basophils Relative 0 %      Neutrophils Absolute 4 61 Thousands/µL      Lymphocytes Absolute 2 01 Thousands/µL      Monocytes Absolute 0 33 Thousand/µL      Eosinophils Absolute 0 21 Thousand/µL      Basophils Absolute 0 02 Thousands/µL     POCT urinalysis dipstick [[id number]]     Lab Status:  No result     Lactic acid x2 Q2H [[id number]]     Lab Status:  No result Specimen:  Blood                  CT abdomen pelvis with contrast   Final Result by Trang Poster, DO (05/18 2333)         1  Mildly distended fluid-filled loops of small bowel in the left hemiabdomen which may represent infectious or inflammatory enteritis  Follow up with GI is recommended  2   Enlarged fatty liver with recanalization of the paraumbilical vein suggesting portal hypertension  3   Infrarenal abdominal aortic aneurysm  4   Mild prostatomegaly   5  Right lower lobe consolidation which may represent pneumonia    Follow-up to resolution is recommended            Workstation performed: [id number]               Procedures  Procedures      Phone Consults  ED Phone Contact    ED Course  ED Course as of May 19 0114   Fri May 18, 2018   5324 1  Sudie Cloud distended fluid-filled loops of small bowel in the left hemiabdomen which may represent infectious or inflammatory enteritis   Follow up with GI is recommended  2   Enlarged fatty liver with recanalization of the paraumbilical vein suggesting portal hypertension  3   Infrarenal abdominal aortic aneurysm  4   Mild prostatomegaly  5   Right lower lobe consolidation which may represent pneumonia   Follow-up to resolution is recommended    2338 CTAP 12/17 - VESSELS:  Top normal size of the infrarenal aorta measuring up to 2 8 cm in greatest AP dimension  2340   Repeat abdominal exam improved, mild pain left lower quadrant  Discussed plan of delayed antibiotics of continued symptoms and return precautions  Patient agreeable and  verbalized understanding                                MDM  CritCare Time    Disposition  Final diagnoses:   Enteritis   Nonspecific abdominal pain     Time reflects when diagnosis was documented in both MDM as applicable and the Disposition within this note     Time User Action Codes Description Comment    5/18/2018 11:47 PM Maximiliano LUCIANO Add [K52 9] Enteritis     5/18/2018 11:58 PM Hazel Dias Add [R10 9] Nonspecific abdominal pain       ED Disposition     ED Disposition Condition Comment    Discharge  Janine Toribio discharge to home/self care      Condition at discharge: Good        Follow-up Information     Follow up With Specialties Details Why [address] [facility] Emergency Department Emergency Medicine Go to If symptoms worsen [facility] [facility]  [phone] [place] ED, [address] , Jeffrey Sanderson MD Family Medicine Schedule an appointment as soon as possible for a visit in 2 days For re-check [address] [place]  [address] [facility] [address]  49  [facility]       Wolf Arriaza MD Gastroenterology Schedule an appointment as soon as possible for a visit in 3 days As needed [address] [facility] [address]  [phone]             Patient's Medications   Discharge Prescriptions CIPROFLOXACIN (CIPRO) 500 MG TABLET    Take 1 tablet (500 mg total) by mouth 2 (two) times a day for 10 days       Start Date: 5/18/2018 End Date: 5/28/2018       Order Dose: 500 mg       Quantity: 20 tablet    Refills: 0    METRONIDAZOLE (FLAGYL) 500 MG TABLET    Take 1 tablet (500 mg total) by mouth every 8 (eight) hours for 10 days       Start Date: 5/18/2018 End Date: 5/28/2018       Order Dose: 500 mg       Quantity: 30 tablet    Refills: 0     No discharge procedures on file  ED Provider  Attending physically available and evaluated Krysta North I managed the patient along with the ED Attending      Electronically Signed by         Victorina Geller DO  05/19/18 5863

## 2018-05-19 NOTE — ED NOTES
Patient transported to [address] [facility], [address] [place] [address]  05/18/18 9766

## 2018-05-19 NOTE — ED NOTES
Patient resting comfortably, no signs of distress, respirations even and unlabored        Didi Dupont RN  05/19/18 7627

## 2018-05-19 NOTE — DISCHARGE INSTRUCTIONS
Follow-up with the primary care doctor in 1-2 days for re-evaluation  Again antibiotic course if her symptoms do not improve in 24-48 hours  Return if he developed any fevers, shaking chills, vomiting or the inability to tolerate your antibiotic  Follow up with the specialists, GI doctor with the information provided in 3-4 days  Enteritis   CUIDADO AMBULATORIO:   Enteritis  es la inflamación del intestino light  Podría ser provocada por comer alimentos o margo líquidos contaminados con un virus, bacteria o parásitos  También podría ser a causa de ciertos medicamentos, por daño proveniente de la radiación y por condiciones médicas irish la enfermedad de [place]  Los signos y síntomas más comunes incluyen los siguientes:   · Diarrea    · Perico o mucosidad en bernie movimientos intestinales    · Náuseas y vómitos    · Derinda Brochure o escalofríos    · Dolor abdominal  Busque atención médica de inmediato si:   · Usted no puede dejar de vomitar  · Usted no ha orinado en 12 horas  Pregúntele a quijano Ernestine Byes vitaminas y minerales son adecuados para usted  · Usted tiene fiebre más yfn de 101 5  · Usted tiene perico o mucosidad en bernie movimientos intestinales  · Usted continúa vomitando o tiene diarrea por más de 3 días, incluso después del tratamiento  · Usted tiene la boca y ojos resecos, está orinando menos de lo normal y se siente mareado cuando se pone de pie  · Quijano boca u ojos están secos  Usted no está orinando tanto o con la misma frecuencia  · Usted pierde peso sin proponérselo  · Usted tiene preguntas o inquietudes acerca de quijano condición o cuidado  El tratamiento para la enteritis  depende de la causa  La enteritis podría mejorar por sí paola, o es posible que usted necesite alguno de los siguientes:  · Moon Worleyada administrarle medicamentospara combatir andres infección provocada por andres bacteria o un parásito   Es posible que también necesite medicamento para reducir o detener la diarrea y los vómitos  No tome estos medicamentos a menos que quijano médico se lo autorice  Podrían ser necesarios otros medicamentos para tratar las condiciones médicas que están provocando la enteritis  · Consuma alimentos que le ayuden a disminuir bernie síntomas  Limite o evite los alimentos y líquidos que son altos en azúcar, grasa y Susie para ayudar a aliviar la diarrea  Podría ayudar que evite la lactosa  La lactosa es un tipo de azúcar que se encuentra en los productos lácteos  Es posible que usted [employer] sopas, caldos, verduras je cocidas, frutas enlatadas y mick horneadas o asadas  Pregunte a quijano dietista o médico si usted debería seguir andres dieta especial  Es posible que necesite evitar otros alimentos si tiene ciertas condiciones médicas irish la enfermedad celíaca  · [address] se le haya indicado  Pregunte cuánto líquido debe margo cada día y cuáles líquidos son los más adecuados para usted  Es importante evitar o tratar la deshidratación  Incluso si usted ha estado vomitando, chupe hielo triturado o tome tragos pequeños de líquidos thom con frecuencia  Poco a poco, aumente la cantidad de líquidos thom que usted tome  Si se deshidrata, es probable que necesite líquidos por vía intravenosa  · [place] andres solución de rehidratación oral (SRO) irish se le indique  Esta solución contiene agua, sales y azúcares necesarios para reemplazar los líquidos corporales perdidos  Pregunte qué tipo de solución de rehidratación oral debe usar, qué cantidad debe margo y dónde puede obtenerla  Evite la enteritis:  La enteritis que es provocada por andres bacteria, parásitos o virus puede evitarse  Lo siguiente podría ayudar a evitar kendall tipo de enteritis:  · Lávese las harmony frecuentemente  Utilice agua y [place]  [employer] las harmony después de usar el baño, cambiarle el pañal a un humaira o estornudar  Lávese las harmony antes de comer o preparar alimentos             · [address] y lave la ropa con frecuencia  Lave rueda ropa y bernie toallas por separado del jeramy de la ropa  Limpie las superficies de rueda hogar con limpiador antibacterial o con blanqueador  · Lave y cocine je los alimentos  Lave las verduras crudas antes de cocinar  [address] y [place]  No utilice los mismos platos para las mick crudas que para otros alimentos  Ponga en el refrigerador inmediatamente cualquier alimento que haya sobrado  · Esté alerta cuando usted vaya de campamento o cuando viaje  Solamente tome agua limpia  No tome agua de los cooper o william a menos que usted purifique o hierva el agua beryl  Cuando viaje, tome agua embotellada y no le ponga hielo  No coma fruta con la cáscara  No coma pescado crudo o mick que no están cocinadas completamente  © 2017 [facility] Information is for End User's use only and may not be sold, redistributed or otherwise used for commercial purposes  All illustrations and images included in CareNotes® are the copyrighted property of A D A M , Inc  or Diego Davis  Esta información es sólo para uso en educación  Rueda intención no es darle un consejo médico sobre enfermedades o tratamientos  Colsulte con rueda Catherne Johnny farmacéutico antes de seguir cualquier régimen médico para saber si es seguro y efectivo para usted

## 2018-05-19 NOTE — ED ATTENDING ATTESTATION
Jt Hassan MD, saw and evaluated the patient  I have discussed the patient with the resident/non-physician practitioner and agree with the resident's/non-physician practitioner's findings, Plan of Care, and MDM as documented in the resident's/non-physician practitioner's note, except where noted  All available labs and Radiology studies were reviewed  At this point I agree with the current assessment done in the Emergency Department  I have conducted an independent evaluation of this patient a history and physical is as follows:    71 y/o M presents for evaluation of LLQ pain x 1 day  No n/v//f/c, no diarrhea/constiapton, no testicular complaints, no urinary complaints  10 systems reviewed and otherwise negative  On exam nad,, lungs nml, cardiac nml, abd  +ttp llq with guarding no rebound/cvat    MDM: acute low abd pain-will do abd labs, lactic, urine dip, ct a/p to r/o acute intra-abdominal pathology, prn pain meds, reassess  Critical Care Time  CritCare Time    Procedures

## 2018-06-12 ENCOUNTER — HOSPITAL ENCOUNTER (EMERGENCY)
Facility: HOSPITAL | Age: 63
Discharge: HOME | End: 2018-06-12
Attending: EMERGENCY MEDICINE
Payer: MEDICARE

## 2018-06-12 VITALS
DIASTOLIC BLOOD PRESSURE: 70 MMHG | OXYGEN SATURATION: 99 % | SYSTOLIC BLOOD PRESSURE: 134 MMHG | TEMPERATURE: 99 F | HEART RATE: 80 BPM | WEIGHT: 156.53 LBS | BODY MASS INDEX: 28.63 KG/M2 | RESPIRATION RATE: 18 BRPM

## 2018-06-12 DIAGNOSIS — L84 CALLUS OF FOOT: Primary | ICD-10-CM

## 2018-06-12 PROCEDURE — 99283 EMERGENCY DEPT VISIT LOW MDM: CPT

## 2018-06-12 RX ORDER — IBUPROFEN 600 MG/1
600 TABLET ORAL ONCE
Status: COMPLETED | OUTPATIENT
Start: 2018-06-12 | End: 2018-06-12

## 2018-06-12 RX ORDER — IBUPROFEN 600 MG/1
600 TABLET ORAL EVERY 6 HOURS PRN
Qty: 30 TABLET | Refills: 0 | Status: SHIPPED | OUTPATIENT
Start: 2018-06-12

## 2018-06-12 RX ORDER — LIDOCAINE 40 MG/G
CREAM TOPICAL AS NEEDED
Qty: 30 G | Refills: 0 | Status: SHIPPED | OUTPATIENT
Start: 2018-06-12

## 2018-06-12 RX ADMIN — IBUPROFEN 600 MG: 600 TABLET ORAL at 22:13

## 2018-06-13 NOTE — ED PROVIDER NOTES
History  Chief Complaint   Patient presents with    Foot Pain     Pt states "my feet hurt for 3 days  Its hard to walk  I have calluses on my feet"     55-year-old male with past medical history of hyperlipidemia, hypertension, peripheral artery disease, who presents to the emergency department for bilateral foot pain x3 days  Patient states that he has multiple calluses to his bilateral soles, that are tender when he walks on them  States that the pain is a 10/10 pain that is worse with ambulation  Denies taking any pain medication prior to arrival   Denies any fevers, chills, redness, warmth, swelling, ecchymosis, numbness, tingling, weakness  Denies trauma  History provided by:  Patient   used: No        Prior to Admission Medications   Prescriptions Last Dose Informant Patient Reported? Taking?   atorvastatin (LIPITOR) 40 mg tablet   Yes No   Sig: Take 40 mg by mouth daily   lisinopril (ZESTRIL) 20 mg tablet   Yes No   Sig: Take 20 mg by mouth daily      Facility-Administered Medications: None       Past Medical History:   Diagnosis Date    Hyperlipidemia     Hypertension     Peripheral arterial disease ([facility] [address] )        Past Surgical History:   Procedure Laterality Date    HERNIA REPAIR      R lower quad    SHOULDER SURGERY         History reviewed  No pertinent family history  I have reviewed and agree with the history as documented  Social History   Substance Use Topics    Smoking status: Current Every Day Smoker    Smokeless tobacco: Never Used    Alcohol use No        Review of Systems   Constitutional: Negative for chills and fever  HENT: Negative for congestion, ear pain, postnasal drip, rhinorrhea, sinus pain, sinus pressure, sore throat and trouble swallowing  Eyes: Negative  Respiratory: Negative for cough, chest tightness, shortness of breath and wheezing  Cardiovascular: Negative for chest pain, palpitations and leg swelling     Gastrointestinal: Negative for abdominal pain, anal bleeding, constipation, diarrhea, nausea and vomiting  Genitourinary: Negative for dysuria, flank pain, frequency, hematuria and urgency  Musculoskeletal: Negative for arthralgias, back pain, gait problem, joint swelling, myalgias, neck pain and neck stiffness  Skin: Positive for rash  Negative for color change, pallor and wound  Neurological: Negative for dizziness, syncope, weakness, light-headedness, numbness and headaches  Psychiatric/Behavioral: Negative  Physical Exam  Physical Exam   Constitutional: He is oriented to person, place, and time  He appears well-developed and well-nourished  HENT:   Head: Normocephalic and atraumatic  Mouth/Throat: Oropharynx is clear and moist    Eyes: Conjunctivae and EOM are normal  Pupils are equal, round, and reactive to light  Neck: Normal range of motion  Neck supple  Cardiovascular: Normal rate and intact distal pulses  Pulmonary/Chest: Effort normal    Abdominal: Soft  Musculoskeletal: Normal range of motion  He exhibits no edema or tenderness  Neurological: He is alert and oriented to person, place, and time  No cranial nerve deficit or sensory deficit  He exhibits normal muscle tone  Coordination normal    Skin: Skin is warm and dry  Capillary refill takes less than 2 seconds  There are multiple calluses to the bilateral soles of the feet that are tender to palpation  No blisters  No overlying erythema or warmth or edema  Psychiatric: He has a normal mood and affect  His behavior is normal    Nursing note and vitals reviewed        Vital Signs  ED Triage Vitals [06/12/18 2153]   Temperature Pulse Respirations Blood Pressure SpO2   99 °F (37 2 °C) 80 18 134/70 99 %      Temp src Heart Rate Source Patient Position - Orthostatic VS BP Location FiO2 (%)   -- -- -- -- --      Pain Score       Worst Possible Pain           Vitals:    06/12/18 2153   BP: 134/70   Pulse: 80       Visual Acuity      ED Medications  Medications   ibuprofen (MOTRIN) tablet 600 mg (600 mg Oral Given 6/12/18 3963)       Diagnostic Studies  Results Reviewed     None                 No orders to display              Procedures  Procedures       Phone Contacts  ED Phone Contact    ED Course                               MDM  Number of Diagnoses or Management Options  Diagnosis management comments: 59-year-old male with past medical history of hyperlipidemia, hypertension, peripheral artery disease, who presents to the emergency department for bilateral foot pain x3 days  Differential Diagnosis includes but is not limited to: calluses vs blisters  Low suspicion for fracture dislocation or abscess  Patient given Motrin in the emergency department for calluses  Discussed with him that we do not removed calluses in the emergency department  Will refer to podiatry for follow-up  Also given prescription for lidocaine cream for symptomatic treatment  Discharge home at this time  CritCare Time    Disposition  Final diagnoses:   Callus of foot     Time reflects when diagnosis was documented in both MDM as applicable and the Disposition within this note     Time User Action Codes Description Comment    6/12/2018 10:14 PM Apolonia Arteaga Add [L84] Callus of foot       ED Disposition     ED Disposition Condition Comment    Discharge  Katt Enter discharge to home/self care      Condition at discharge: Good        Follow-up Information     Follow up With Specialties Details Why Contact Jocelynn Becker MD Family Medicine In 3 days  [address] [facility]  [address] [facility]      Roberto Cabral DPM Podiatry Schedule an appointment as soon as possible for a visit  [address] [place] [address]  13 [facility]            Patient's Medications   Discharge Prescriptions    IBUPROFEN (MOTRIN) 600 MG TABLET    Take 1 tablet (600 mg total) by mouth every 6 (six) hours as needed for mild pain Start Date: 6/12/2018 End Date: --       Order Dose: 600 mg       Quantity: 30 tablet    Refills: 0    LIDOCAINE (LMX) 4 % CREAM    Apply topically as needed for mild pain       Start Date: 6/12/2018 End Date: --       Order Dose: --       Quantity: 30 g    Refills: 0     No discharge procedures on file      ED Provider  Electronically Signed by           Radha Wilhelm PA-C  06/12/18 6412

## 2018-06-13 NOTE — DISCHARGE INSTRUCTIONS
Metatarsalgia   CUIDADO AMBULATORIO:   Metatarsalgia  es dolor en la morales (parte anterior de la planta) del pie, cerca del [place], tercer y cuarto dedos  Signos y síntomas comunes de la metatarsalgia:  Los síntomas generalmente se presentan con el tiempo, jose usted podría sentir dolor repentino por andres Jeanann Dedra  Puede presentar cualquiera de los siguientes signos o síntomas:  · Dolor en la morales del pie o cerca de los dedos del pie que empeora al caminar o pararse, especialmente en superficies duras    · Dolor mientras realiza ejercicios tales irish correr    · Dolor dontrell o punzante en los dedos del pie que puede empeorar al flexionar los dedos    · Hormigueo o entumecimiento en los dedos del pie    · Sensación de que está caminando sobre las piedras o de que tiene un hematoma (narinder)    · Cambio en quijano forma de caminar para tratar de evitar poner presión en la morales del pie  Pregúntele a quijano médico qué vitaminas y minerales son adecuados para usted  · Presenta dolor de rodilla, cadera o espalda  · Tiene más dolor o enrojecimiento en el pie  · Usted tiene preguntas o inquietudes acerca de quijano condición o cuidado  Tratamiento:  Si es posible, la causa de quijano metatarsalgia será tratada  Es posible que también necesite alguno de los siguientes tratamientos:  · AINEs (Analgésicos antiinflamatorios no esteroides) irish el ibuprofeno, ayudan a disminuir la inflamación, el dolor y la fiebre  Kendall medicamento esta disponible con o sin andres receta médica  Los AINEs pueden causar sangrado estomacal o problemas renales en ciertas personas  Si usted esta tomando un anticoágulante,  siempre  pregunte si los AINEs son seguros para usted  Siempre edgardo la etiqueta de kendall medicamento y [place] instrucciones  No administre kendall medicamento a niños menores de 6 meses de rafy sin antes obtener la autorización de quijano médico      · Ultrasonido  puede usarse para disminuir quijano dolor   La aplicación de ondas sonoras con [facility] puede enviar calor profundo a los tejidos  · Andres inyección de esteroides  puede ayudar a disminuir la inflamación  · Cirugía  podría usarse si otros tratamientos no funcionan  [place] se puede utilizar para alinear los huesos cerca de los dedos del pie  También es posible que se le deba realizar cirugía para corregir un problema irish el dedo en martillo  Controle o prevenga la metatarsalgia:   · Repose rueda pie  Si usted practica deportes, es posible que no pueda hacer ejercicios con carga  Ejemplos de ejercicios con John Sniff son nadar y andar en bicicleta  Pregunte a rueda médico cuáles son los ejercicios más adecuados para usted  · Aplique hielo según las indicaciones  El hielo ayuda a disminuir el dolor y la inflamación  Use un paquete de hielo o ponga hielo molido dentro de [employer]  Cubra el paquete o la bolsa con andres toalla antes de colocarlos sobre rueda pie  Aplique hielo lyn 15 a 20 minutos por hora o según indicaciones  · Use un bastón o muleta si se los colin indicado  Estos dispositivos pueden ayudar a aliviar la dora [place]  · Use calzado adecuado  No use zapatos estrechos o apretados  Puede que deba usar calzado que sea más ancho que el que [place] normalmente  Use zapatos que no tengan tacón alto  El calzado con amortiguación de impacto puede ayudar a prevenir lesiones  Jailyn calzado le bina soporte extra debajo de bernie pies y dedos  También puede colocar almohadillas en el interior del zapato o en la planta del pie, cerca de los dedos  Las almohadillas pueden brindarle más soporte y comodidad cuando camina o permanece de pie  Los soportes para el arco pueden ayudar a aliviar la presión [employer] dedos  · [place] o mantenga un peso saludable  El peso adicional puede aumentar la presión en bernie pies  Consulte con rueda médico cuál debería ser rueda peso saludable   Rueda médico le puede ayudar a elaborar un plan para perder peso de manera hilario si usted tiene sobrepeso  · Vaya a fisioterapia según le indicaron  Un fisioterapeuta podría darle ejercicios para mejorar rueda fuerza y rango de [place]  El fisioterapeuta también puede ayudarlo a mejorar la manera de caminar para evitar que vuelva a tener metatarsalgia  El terapeuta también puede enseñar ejercicios para ayudar a aliviar el dolor  Acuda a bernie consultas de control con rueda médico según le indicaron  Anote bernie preguntas para que se acuerde de hacerlas lyn bernie visitas  © 2017 [address] Information is for End User's use only and may not be sold, redistributed or otherwise used for commercial purposes  All illustrations and images included in CareNotes® are the copyrighted property of A D A M , Inc  or Diego Davis  Esta información es sólo para uso en educación  Rueda intención no es darle un consejo médico sobre enfermedades o tratamientos  Colsulte con rueda Smiley Vasyl farmacéutico antes de seguir cualquier régimen médico para saber si es seguro y efectivo para usted